# Patient Record
Sex: MALE | Race: WHITE | NOT HISPANIC OR LATINO | Employment: OTHER | URBAN - METROPOLITAN AREA
[De-identification: names, ages, dates, MRNs, and addresses within clinical notes are randomized per-mention and may not be internally consistent; named-entity substitution may affect disease eponyms.]

---

## 2023-09-02 PROBLEM — L57.0 ACTINIC KERATOSIS: Status: ACTIVE | Noted: 2021-11-15

## 2023-09-02 PROBLEM — I67.1 ANEURYSM OF ANTERIOR COMMUNICATING ARTERY (HHS-HCC): Status: ACTIVE | Noted: 2023-09-02

## 2023-09-02 PROBLEM — L60.3 NAIL DYSTROPHY: Status: ACTIVE | Noted: 2021-11-15

## 2023-09-02 PROBLEM — D48.5 NEOPLASM OF UNCERTAIN BEHAVIOR OF SKIN: Status: ACTIVE | Noted: 2021-11-15

## 2023-09-02 PROBLEM — H90.3 BILATERAL SENSORINEURAL HEARING LOSS: Status: ACTIVE | Noted: 2023-09-02

## 2023-09-02 PROBLEM — J34.89 LESION OR MASS OF PARANASAL SINUSES: Status: ACTIVE | Noted: 2023-09-02

## 2023-09-02 PROBLEM — R31.9 HEMATURIA: Status: ACTIVE | Noted: 2023-09-02

## 2023-09-02 PROBLEM — C44.41 BASAL CELL CARCINOMA OF SKIN OF SCALP AND NECK: Status: ACTIVE | Noted: 2021-11-15

## 2023-09-02 PROBLEM — C67.9 BLADDER CANCER (MULTI): Status: ACTIVE | Noted: 2023-09-02

## 2023-09-02 PROBLEM — L81.4 OTHER MELANIN HYPERPIGMENTATION: Status: ACTIVE | Noted: 2021-11-15

## 2023-09-02 PROBLEM — R30.0 DYSURIA: Status: ACTIVE | Noted: 2023-09-02

## 2023-09-02 RX ORDER — GLIMEPIRIDE 2 MG/1
2 TABLET ORAL
COMMUNITY
Start: 2015-02-02

## 2023-09-02 RX ORDER — EZETIMIBE 10 MG/1
1 TABLET ORAL DAILY
COMMUNITY
Start: 2014-10-24

## 2023-09-02 RX ORDER — OMEPRAZOLE 20 MG/1
20 CAPSULE, DELAYED RELEASE ORAL
COMMUNITY

## 2023-09-02 RX ORDER — ISOSORBIDE MONONITRATE 30 MG/1
1 TABLET, EXTENDED RELEASE ORAL DAILY
COMMUNITY

## 2023-09-02 RX ORDER — DILTIAZEM HYDROCHLORIDE EXTENDED-RELEASE TABLETS 240 MG/1
TABLET, EXTENDED RELEASE ORAL
COMMUNITY

## 2023-09-02 RX ORDER — ATORVASTATIN CALCIUM 80 MG/1
1 TABLET, FILM COATED ORAL NIGHTLY
COMMUNITY

## 2023-09-02 RX ORDER — METFORMIN HYDROCHLORIDE 500 MG/1
TABLET ORAL 2 TIMES DAILY
COMMUNITY
Start: 2015-11-02

## 2023-09-02 RX ORDER — HYDROCORTISONE 25 MG/G
CREAM TOPICAL
COMMUNITY
Start: 2020-12-15

## 2023-09-02 RX ORDER — BIMATOPROST 0.1 MG/ML
1 SOLUTION/ DROPS OPHTHALMIC NIGHTLY
COMMUNITY
Start: 2015-03-10

## 2023-09-02 RX ORDER — NITROGLYCERIN 0.4 MG/1
0.4 TABLET SUBLINGUAL
COMMUNITY
Start: 2016-10-26

## 2023-09-02 RX ORDER — LISINOPRIL 5 MG/1
1 TABLET ORAL DAILY
COMMUNITY
Start: 2015-02-23

## 2023-09-02 RX ORDER — CALCIUM CITRATE/VITAMIN D3 200MG-6.25
1 TABLET ORAL 3 TIMES DAILY
COMMUNITY
Start: 2016-03-15

## 2023-09-26 LAB
ALANINE AMINOTRANSFERASE (SGPT) (U/L) IN SER/PLAS: 13 U/L (ref 10–52)
ALBUMIN (G/DL) IN SER/PLAS: 4.1 G/DL (ref 3.4–5)
ALKALINE PHOSPHATASE (U/L) IN SER/PLAS: 64 U/L (ref 33–136)
ANION GAP IN SER/PLAS: 14 MMOL/L (ref 10–20)
ASPARTATE AMINOTRANSFERASE (SGOT) (U/L) IN SER/PLAS: 15 U/L (ref 9–39)
BILIRUBIN TOTAL (MG/DL) IN SER/PLAS: 0.5 MG/DL (ref 0–1.2)
CALCIUM (MG/DL) IN SER/PLAS: 9.6 MG/DL (ref 8.6–10.6)
CARBON DIOXIDE, TOTAL (MMOL/L) IN SER/PLAS: 28 MMOL/L (ref 21–32)
CHLORIDE (MMOL/L) IN SER/PLAS: 102 MMOL/L (ref 98–107)
CREATININE (MG/DL) IN SER/PLAS: 1.42 MG/DL (ref 0.5–1.3)
ERYTHROCYTE DISTRIBUTION WIDTH (RATIO) BY AUTOMATED COUNT: 13.5 % (ref 11.5–14.5)
ERYTHROCYTE MEAN CORPUSCULAR HEMOGLOBIN CONCENTRATION (G/DL) BY AUTOMATED: 31.9 G/DL (ref 32–36)
ERYTHROCYTE MEAN CORPUSCULAR VOLUME (FL) BY AUTOMATED COUNT: 102 FL (ref 80–100)
ERYTHROCYTES (10*6/UL) IN BLOOD BY AUTOMATED COUNT: 3.53 X10E12/L (ref 4.5–5.9)
GFR MALE: 48 ML/MIN/1.73M2
GLUCOSE (MG/DL) IN SER/PLAS: 137 MG/DL (ref 74–99)
HEMATOCRIT (%) IN BLOOD BY AUTOMATED COUNT: 36 % (ref 41–52)
HEMOGLOBIN (G/DL) IN BLOOD: 11.5 G/DL (ref 13.5–17.5)
LEUKOCYTES (10*3/UL) IN BLOOD BY AUTOMATED COUNT: 7.1 X10E9/L (ref 4.4–11.3)
NRBC (PER 100 WBCS) BY AUTOMATED COUNT: 0 /100 WBC (ref 0–0)
PLATELETS (10*3/UL) IN BLOOD AUTOMATED COUNT: 200 X10E9/L (ref 150–450)
POTASSIUM (MMOL/L) IN SER/PLAS: 4.6 MMOL/L (ref 3.5–5.3)
PROSTATE SPECIFIC AG (NG/ML) IN SER/PLAS: 1.33 NG/ML (ref 0–4)
PROTEIN TOTAL: 6.4 G/DL (ref 6.4–8.2)
SODIUM (MMOL/L) IN SER/PLAS: 139 MMOL/L (ref 136–145)
UREA NITROGEN (MG/DL) IN SER/PLAS: 21 MG/DL (ref 6–23)

## 2023-09-28 LAB
APPEARANCE, URINE: NORMAL
ASCORBIC ACID: NORMAL MG/DL
BILIRUBIN, URINE: NORMAL
BLOOD, URINE: NORMAL
COLOR, URINE: NORMAL
GLUCOSE, URINE: NORMAL
KETONES, URINE: NORMAL
LEUKOCYTE ESTERASE, URINE: NORMAL
NITRITE, URINE: NORMAL
PH, URINE: NORMAL
PROTEIN, URINE: NORMAL
SPECIFIC GRAVITY, URINE: NORMAL
UROBILINOGEN, URINE: NORMAL

## 2023-09-30 LAB — URINE CULTURE: ABNORMAL

## 2023-10-02 ENCOUNTER — TELEPHONE (OUTPATIENT)
Dept: UROLOGY | Facility: CLINIC | Age: 88
End: 2023-10-02
Payer: MEDICARE

## 2023-10-02 DIAGNOSIS — N30.00 ACUTE CYSTITIS WITHOUT HEMATURIA: Primary | ICD-10-CM

## 2023-10-02 RX ORDER — SULFAMETHOXAZOLE AND TRIMETHOPRIM 800; 160 MG/1; MG/1
1 TABLET ORAL 2 TIMES DAILY
Qty: 6 TABLET | Refills: 0 | Status: SHIPPED | OUTPATIENT
Start: 2023-10-02 | End: 2023-10-05

## 2023-10-10 ENCOUNTER — OFFICE VISIT (OUTPATIENT)
Dept: UROLOGY | Facility: CLINIC | Age: 88
End: 2023-10-10
Payer: MEDICARE

## 2023-10-10 VITALS
HEIGHT: 70 IN | SYSTOLIC BLOOD PRESSURE: 115 MMHG | BODY MASS INDEX: 23.19 KG/M2 | WEIGHT: 162 LBS | DIASTOLIC BLOOD PRESSURE: 69 MMHG | RESPIRATION RATE: 16 BRPM

## 2023-10-10 DIAGNOSIS — R31.21 ASYMPTOMATIC MICROSCOPIC HEMATURIA: ICD-10-CM

## 2023-10-10 DIAGNOSIS — C67.8 MALIGNANT NEOPLASM OF OVERLAPPING SITES OF BLADDER (MULTI): Primary | ICD-10-CM

## 2023-10-10 DIAGNOSIS — C67.8 MALIGNANT NEOPLASM OF OVERLAPPING SITES OF BLADDER (MULTI): ICD-10-CM

## 2023-10-10 PROCEDURE — 1159F MED LIST DOCD IN RCRD: CPT | Performed by: UROLOGY

## 2023-10-10 PROCEDURE — 1126F AMNT PAIN NOTED NONE PRSNT: CPT | Performed by: UROLOGY

## 2023-10-10 PROCEDURE — 99214 OFFICE O/P EST MOD 30 MIN: CPT | Performed by: UROLOGY

## 2023-10-10 PROCEDURE — 1036F TOBACCO NON-USER: CPT | Performed by: UROLOGY

## 2023-10-10 PROCEDURE — 1160F RVW MEDS BY RX/DR IN RCRD: CPT | Performed by: UROLOGY

## 2023-10-10 ASSESSMENT — ENCOUNTER SYMPTOMS
FREQUENCY: 0
DYSURIA: 0
HEMATURIA: 0
DIFFICULTY URINATING: 0

## 2023-10-10 ASSESSMENT — PAIN SCALES - GENERAL: PAINLEVEL: 0-NO PAIN

## 2023-10-10 NOTE — PATIENT INSTRUCTIONS
Patient Discussion/Summary     It was great to see you once again. All of your testing including a CAT scan was negative and no change. , Creatinine slightly increased to 1.42 however you state that your primary care physician is following this closely.  PSA is very low at 1.33.  CAT scan shows negative for metastatic disease. Your last cystoscopy 6 months ago was negative by report. Dr. Banks will repeat the cystoscopy in 6 months. I will see you again in 1 year.      This note was created with voice-recognition software and was not corrected for typographical or grammatical errors

## 2023-10-10 NOTE — LETTER
October 10, 2023     Jose Maria Zamudio MD  [address] [place] [address]    Patient: Leon J Weil, MD   YOB: 1935   Date of Visit: 10/10/2023       Dear Dr. Jose Maria Zamudio MD:    Thank you for referring Harvey Weil to me for evaluation. Below are my notes for this consultation.  If you have questions, please do not hesitate to call me. I look forward to following your patient along with you.       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 He received only one course of BCG at an outside institution under the direction of Dr. Banks. Negative family history of prostate or breast cancer. 40-pack-year cigarette smoking history.     Re-biopsy on 11/15/10 was also CIS, Patient transferred to my service and received to complete cycles of one third BCG and 50 million units of INTERFERON intravesically. Dr. Banks continue to provide service with TURBTs and biopsies which were all negative.     10/24/14 all testing is negative. The patient recently had a CYSTOSCOPY with Dr. Banks which was negative.      XARELTO      11/02/15, now 5 years status post his last BLADDER CANCER. Treated with BCG and interferon. All lab testing appears normal. There is a question of a LUNG NODULE and also of a COMPLEX RENAL CYSTS. The patient will obtain the old films and bring them to the [facility] for comparison. He does not want to pursue an MRI at this time. I will see him again in 1 year. By report, Dr. Banks performed a cystoscopy which was negative.     11/02/16, recent CYSTOSCOPY with Dr. Banks identified positive BLADDER CANCER, noninvasive  low-grade disease, TA low-grade BLADDER CANCER. All urine and blood studies are normal. CAT scan of the chest, abdomen and pelvis is negative. He will return in 1 year. However, should he have multiple recurrences during the year we will consider treatment with one third BCG for 6 week period.     03/03/17, OR, Dr. Banks, TURBT, LOW-GRADE PAPILLARY UROTHELIAL CELL CARCINOMA     10/20/17, patient has no new complaints. Creatinine 1.20. Hematocrit 39%. Urinalysis is negative. PSA 0.99. CT of the chest, abdomen and pelvis is negative for metastatic disease. Stable small pulmonary nodules no larger than 4 mm. Urine culture shows chronic staph. Cytology is negative. He will return in 1 year. He will obtain cystoscopies every 6 months from Dr. Dickerson.     07/02/18, OR, Dr. Banks, cystoscopy, raised lesion, 5 mm, lateral to the left ureteral orifice, fulgurated.     10/19/18, patient has no new complaints. CAT scan of the chest abdomen and pelvis is stable with several small pulmonary nodules unchanged. Urine cytology was negative. PSA 1.18. Creatinine has elevated to 1.40, previously 1.20 last year. The GFR is now down to 49. I am concerned that this may represent diabetic nephropathy and have recommended that he obtain a nephrologist. He wishes to do that on his own. Hematocrit 37%. He will return in 1 year for his routine follow-up. He is now 1 year and 7 months since his last positive bladder cancer.     12/17/18, patient calls complaining of symptoms of prostatitis and Cipro 500 mg by mouth every 12 hours for 14 days ordered.     11/01/19, patient arrives alone. He has no new complaints. No longer symptoms of prostatitis. His creatinine is followed by his primary care and if vacillates between 1.2 and 1.4. Urinalysis, urine culture and urine cytology are negative. PSA 1.03, hematocrit 36%, creatinine 1.21. CAT scan of the chest, abdomen and pelvis shows a stable 4 mm pulmonary nodule, no bladder wall thickening, no  evidence of metastatic disease within the abdomen or pelvis. He will have his next cystoscopy under local anesthesia with Dr. Banks in 6 months. He is now 2 years and 8 months since his last positive bladder cancer. I will see him again in 1 year.     03/03/20, cystoscopy, Dr. Marco Banks. 4 mm papillary tumor left back wall which was fulgurated. TA low grade disease, bladder cancer     09/29/20, OR, Dr. Marco Banks. TURBT of bladder neck. Papillary urothelial hyperplasia     10/23/20, patient arrives alone. He has no new urologic complaints. Creatinine 1.26. Urinalysis does show 166 red blood cells, however this is less than 1 week following his TURBT. Urine culture and urine cytology are negative. PSA was not performed. He will have that done locally. Hematocrit stable at 36%. CAT scan of the chest, abdomen and pelvis shows a stable meter right lower lobe lung nodule. A stable 5 mm left hepatic dome nodule. And a stable 8 mm left adrenal gland adenoma. No evidence of metastatic disease within the abdomen and pelvis. He will continue to see Dr. Banks and have cystoscopies every 6 months. He is now 7 months status post his last bladder cancer. TA low grade disease. I will see him again in 1 year.     October 12, 2021, patient arrives alone. He has no new urologic complaints. Recently underwent Mohs procedure on his scalp and also a removal of a maxillary cyst. Creatinine has risen to 1.43, however he states that his primary care physician, Dr. Zamudio is watching this closely. Urinalysis, urine culture and urine cytology were not performed. Renal colic CAT scan reveals no evidence of metastatic disease, stable adrenal adenoma and stable hepatic lobe liver lesion. In addition, CAT scan of the chest once again identifies no metastatic disease and a stable 7 mm pulmonary nodule which is deemed benign. No need for follow-up at this time. Patient's last cystoscopy with Dr. Gibbons last week was reported is negative. He is  now 13 months since his last positive bladder cancer, TA low-grade disease, no therapy. PSA 1.18. Hematocrit 37%. He will return in 1 year.     March 16, 2022, cardiac catheterization     June 7, 2022, Dr. Banks, cystoscopy with fulguration of 3 mm bladder tumor     October 11, 2022, patient returns and he has no new complaints. Creatinine stable at 1.39 and is followed by his primary care physician Dr. Zamudio. Urinalysis and urine cytology are negative. 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 He would except a Bactrim prescription. Renal colic CAT scan once again reveals no evidence of metastatic disease. Now 2 years since his last positive bladder cancer, TA level stage II disease, no therapy. PSA 1.21. Hematocrit 36%. He will return in 1 year.    March 1, 2023, office visit with his urologist Dr. Marco Gibbons.  Cystoscopy performed no tumors identified.    October 10, 2023, patient returns alone.  He has no new urologic complaints.  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  He states that his urine cultures have been positive for staph for the past 11 years.  Hematocrit is identical at 36%.  CAT scan of the abdomen and pelvis does not show any recurrent or metastatic disease.  Creatinine is slightly increased to 1.42 and is followed by his internist.  PSA is normal at 1.33.  Now 3 years since his last positive bladder cancer, stage TA level stage II disease, no therapy.  Originally identified with CIS and treated with BCG and interferon.  He will return in 1 year.     PLAN:     #1 patient will repeat in 6 months his cystoscopy with Dr. Banks.     #2 he now has a 30% chance of progression over the next 2 years.     #3 he will return in October of 2024 with urine tests, blood tests, PSA, renal colic CAT scan .    Physical Exam  Vitals reviewed.   HENT:      Head: Normocephalic.   Pulmonary:      Effort:  Pulmonary effort is normal.   Musculoskeletal:         General: Normal range of motion.      Cervical back: Normal range of motion.   Neurological:      General: No focal deficit present.      Mental Status: He is alert and oriented to person, place, and time.   Psychiatric:         Mood and Affect: Mood normal.         This note was created with voice-recognition software and was not corrected for typographical or grammatical errors

## 2023-10-10 NOTE — PROGRESS NOTES
Patient denies any pain today. Patient had MOHS on left cheek 7/2023. Patient denies any concerns about falling or safety. Patient has no urinary issues.  lab in [place] did not complete urine cytology.       Review of Systems   Genitourinary:  Negative for difficulty urinating, dysuria, frequency, hematuria and urgency.

## 2023-10-10 NOTE — PROGRESS NOTES
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 He received only one course of BCG at an outside institution under the direction of Dr. Banks. Negative family history of prostate or breast cancer. 40-pack-year cigarette smoking history.     Re-biopsy on 11/15/10 was also CIS, Patient transferred to my service and received to complete cycles of one third BCG and 50 million units of INTERFERON intravesically. Dr. Banks continue to provide service with TURBTs and biopsies which were all negative.     10/24/14 all testing is negative. The patient recently had a CYSTOSCOPY with Dr. Banks which was negative.      XARELTO      11/02/15, now 5 years status post his last BLADDER CANCER. Treated with BCG and interferon. All lab testing appears normal. There is a question of a LUNG NODULE and also of a COMPLEX RENAL CYSTS. The patient will obtain the old films and bring them to the new Center for comparison. He does not want to pursue an MRI at this time. I will see him again in 1 year. By report, Dr. Banks performed a cystoscopy which was negative.     11/02/16, recent CYSTOSCOPY with Dr. Banks identified positive BLADDER CANCER, noninvasive low-grade disease, TA low-grade BLADDER CANCER. All urine and blood studies are normal. CAT scan of the chest, abdomen and pelvis is negative. He will return in 1 year. However, should he have multiple recurrences during the year we will consider treatment with one third BCG for 6 week period.     03/03/17, OR, Dr. Banks, TURBT, LOW-GRADE PAPILLARY UROTHELIAL CELL CARCINOMA     10/20/17, patient has no new complaints. Creatinine 1.20. Hematocrit 39%. Urinalysis is negative. PSA 0.99. CT of the chest, abdomen and pelvis is negative for metastatic disease. Stable small pulmonary nodules no  larger than 4 mm. Urine culture shows chronic staph. Cytology is negative. He will return in 1 year. He will obtain cystoscopies every 6 months from Dr. Dickerson.     07/02/18, OR, Dr. Banks, cystoscopy, raised lesion, 5 mm, lateral to the left ureteral orifice, fulgurated.     10/19/18, patient has no new complaints. CAT scan of the chest abdomen and pelvis is stable with several small pulmonary nodules unchanged. Urine cytology was negative. PSA 1.18. Creatinine has elevated to 1.40, previously 1.20 last year. The GFR is now down to 49. I am concerned that this may represent diabetic nephropathy and have recommended that he obtain a nephrologist. He wishes to do that on his own. Hematocrit 37%. He will return in 1 year for his routine follow-up. He is now 1 year and 7 months since his last positive bladder cancer.     12/17/18, patient calls complaining of symptoms of prostatitis and Cipro 500 mg by mouth every 12 hours for 14 days ordered.     11/01/19, patient arrives alone. He has no new complaints. No longer symptoms of prostatitis. His creatinine is followed by his primary care and if vacillates between 1.2 and 1.4. Urinalysis, urine culture and urine cytology are negative. PSA 1.03, hematocrit 36%, creatinine 1.21. CAT scan of the chest, abdomen and pelvis shows a stable 4 mm pulmonary nodule, no bladder wall thickening, no evidence of metastatic disease within the abdomen or pelvis. He will have his next cystoscopy under local anesthesia with Dr. Banks in 6 months. He is now 2 years and 8 months since his last positive bladder cancer. I will see him again in 1 year.     03/03/20, cystoscopy, Dr. Marco Banks. 4 mm papillary tumor left back wall which was fulgurated. TA low grade disease, bladder cancer     09/29/20, OR, Dr. Marco Banks. TURBT of bladder neck. Papillary urothelial hyperplasia     10/23/20, patient arrives alone. He has no new urologic complaints. Creatinine 1.26. Urinalysis does show 166 red  blood cells, however this is less than 1 week following his TURBT. Urine culture and urine cytology are negative. PSA was not performed. He will have that done locally. Hematocrit stable at 36%. CAT scan of the chest, abdomen and pelvis shows a stable meter right lower lobe lung nodule. A stable 5 mm left hepatic dome nodule. And a stable 8 mm left adrenal gland adenoma. No evidence of metastatic disease within the abdomen and pelvis. He will continue to see Dr. Banks and have cystoscopies every 6 months. He is now 7 months status post his last bladder cancer. TA low grade disease. I will see him again in 1 year.     October 12, 2021, patient arrives alone. He has no new urologic complaints. Recently underwent Mohs procedure on his scalp and also a removal of a maxillary cyst. Creatinine has risen to 1.43, however he states that his primary care physician, Dr. Zamudio is watching this closely. Urinalysis, urine culture and urine cytology were not performed. Renal colic CAT scan reveals no evidence of metastatic disease, stable adrenal adenoma and stable hepatic lobe liver lesion. In addition, CAT scan of the chest once again identifies no metastatic disease and a stable 7 mm pulmonary nodule which is deemed benign. No need for follow-up at this time. Patient's last cystoscopy with Dr. Gibbons last week was reported is negative. He is now 13 months since his last positive bladder cancer, TA low-grade disease, no therapy. PSA 1.18. Hematocrit 37%. He will return in 1 year.     March 16, 2022, cardiac catheterization     June 7, 2022, Dr. Banks, cystoscopy with fulguration of 3 mm bladder tumor     October 11, 2022, patient returns and he has no new complaints. Creatinine stable at 1.39 and is followed by his primary care physician Dr. Zamudio. Urinalysis and urine cytology are negative. 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 He would except a  Bactrim prescription. Renal colic CAT scan once again reveals no evidence of metastatic disease. Now 2 years since his last positive bladder cancer, TA level stage II disease, no therapy. PSA 1.21. Hematocrit 36%. He will return in 1 year.    March 1, 2023, office visit with his urologist Dr. Marco Gibbons.  Cystoscopy performed no tumors identified.    October 10, 2023, patient returns alone.  He has no new urologic complaints.  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  He states that his urine cultures have been positive for staph for the past 11 years.  Hematocrit is identical at 36%.  CAT scan of the abdomen and pelvis does not show any recurrent or metastatic disease.  Creatinine is slightly increased to 1.42 and is followed by his internist.  PSA is normal at 1.33.  Now 3 years since his last positive bladder cancer, stage TA level stage II disease, no therapy.  Originally identified with CIS and treated with BCG and interferon.  He will return in 1 year.     PLAN:     #1 patient will repeat in 6 months his cystoscopy with Dr. Banks.     #2 he now has a 30% chance of progression over the next 2 years.     #3 he will return in October of 2024 with urine tests, blood tests, PSA, renal colic CAT scan .    Physical Exam  Vitals reviewed.   HENT:      Head: Normocephalic.   Pulmonary:      Effort: Pulmonary effort is normal.   Musculoskeletal:         General: Normal range of motion.      Cervical back: Normal range of motion.   Neurological:      General: No focal deficit present.      Mental Status: He is alert and oriented to person, place, and time.   Psychiatric:         Mood and Affect: Mood normal.         This note was created with voice-recognition software and was not corrected for typographical or grammatical errors

## 2024-02-08 ENCOUNTER — HOSPITAL ENCOUNTER (OUTPATIENT)
Dept: RADIOLOGY | Facility: CLINIC | Age: 89
Discharge: HOME | End: 2024-02-08
Payer: MEDICARE

## 2024-02-08 ENCOUNTER — LAB (OUTPATIENT)
Dept: LAB | Facility: LAB | Age: 89
End: 2024-02-08
Payer: MEDICARE

## 2024-02-08 ENCOUNTER — APPOINTMENT (OUTPATIENT)
Dept: RADIOLOGY | Facility: CLINIC | Age: 89
End: 2024-02-08
Payer: MEDICARE

## 2024-02-08 DIAGNOSIS — R31.21 ASYMPTOMATIC MICROSCOPIC HEMATURIA: ICD-10-CM

## 2024-02-08 DIAGNOSIS — C67.8 MALIGNANT NEOPLASM OF OVERLAPPING SITES OF BLADDER (MULTI): ICD-10-CM

## 2024-02-08 PROCEDURE — 74176 CT ABD & PELVIS W/O CONTRAST: CPT

## 2024-02-08 PROCEDURE — 84153 ASSAY OF PSA TOTAL: CPT

## 2024-02-08 PROCEDURE — 36415 COLL VENOUS BLD VENIPUNCTURE: CPT

## 2024-02-08 PROCEDURE — 85027 COMPLETE CBC AUTOMATED: CPT

## 2024-02-08 PROCEDURE — 80048 BASIC METABOLIC PNL TOTAL CA: CPT

## 2024-02-08 PROCEDURE — 74176 CT ABD & PELVIS W/O CONTRAST: CPT | Performed by: RADIOLOGY

## 2024-02-09 LAB
ANION GAP SERPL CALC-SCNC: 17 MMOL/L (ref 10–20)
BUN SERPL-MCNC: 15 MG/DL (ref 6–23)
CALCIUM SERPL-MCNC: 9.5 MG/DL (ref 8.6–10.6)
CHLORIDE SERPL-SCNC: 103 MMOL/L (ref 98–107)
CO2 SERPL-SCNC: 25 MMOL/L (ref 21–32)
CREAT SERPL-MCNC: 1.4 MG/DL (ref 0.5–1.3)
EGFRCR SERPLBLD CKD-EPI 2021: 49 ML/MIN/1.73M*2
ERYTHROCYTE [DISTWIDTH] IN BLOOD BY AUTOMATED COUNT: 13.8 % (ref 11.5–14.5)
GLUCOSE SERPL-MCNC: 175 MG/DL (ref 74–99)
HCT VFR BLD AUTO: 37.9 % (ref 41–52)
HGB BLD-MCNC: 12.4 G/DL (ref 13.5–17.5)
MCH RBC QN AUTO: 32.8 PG (ref 26–34)
MCHC RBC AUTO-ENTMCNC: 32.7 G/DL (ref 32–36)
MCV RBC AUTO: 100 FL (ref 80–100)
NRBC BLD-RTO: 0 /100 WBCS (ref 0–0)
PLATELET # BLD AUTO: 197 X10*3/UL (ref 150–450)
POTASSIUM SERPL-SCNC: 5.3 MMOL/L (ref 3.5–5.3)
PSA SERPL-MCNC: 1.05 NG/ML
RBC # BLD AUTO: 3.78 X10*6/UL (ref 4.5–5.9)
SODIUM SERPL-SCNC: 140 MMOL/L (ref 136–145)
WBC # BLD AUTO: 7.6 X10*3/UL (ref 4.4–11.3)

## 2024-10-14 ENCOUNTER — LAB (OUTPATIENT)
Dept: LAB | Facility: LAB | Age: 89
End: 2024-10-14
Payer: COMMERCIAL

## 2024-10-14 LAB
APPEARANCE UR: ABNORMAL
BILIRUB UR STRIP.AUTO-MCNC: NEGATIVE MG/DL
COLOR UR: ABNORMAL
GLUCOSE UR STRIP.AUTO-MCNC: ABNORMAL MG/DL
KETONES UR STRIP.AUTO-MCNC: NEGATIVE MG/DL
LEUKOCYTE ESTERASE UR QL STRIP.AUTO: ABNORMAL
MUCOUS THREADS #/AREA URNS AUTO: ABNORMAL /LPF
NITRITE UR QL STRIP.AUTO: ABNORMAL
PH UR STRIP.AUTO: 5.5 [PH]
PROT UR STRIP.AUTO-MCNC: NEGATIVE MG/DL
RBC # UR STRIP.AUTO: NEGATIVE /UL
RBC #/AREA URNS AUTO: ABNORMAL /HPF
SP GR UR STRIP.AUTO: 1.01
UROBILINOGEN UR STRIP.AUTO-MCNC: NORMAL MG/DL
WBC #/AREA URNS AUTO: ABNORMAL /HPF

## 2024-10-14 PROCEDURE — 87077 CULTURE AEROBIC IDENTIFY: CPT

## 2024-10-14 PROCEDURE — 87086 URINE CULTURE/COLONY COUNT: CPT

## 2024-10-14 PROCEDURE — 81001 URINALYSIS AUTO W/SCOPE: CPT

## 2024-10-16 ENCOUNTER — APPOINTMENT (OUTPATIENT)
Dept: UROLOGY | Facility: CLINIC | Age: 89
End: 2024-10-16
Payer: MEDICARE

## 2024-10-16 LAB — BACTERIA UR CULT: NORMAL

## 2024-10-19 ENCOUNTER — APPOINTMENT (OUTPATIENT)
Dept: UROLOGY | Facility: CLINIC | Age: 89
End: 2024-10-19
Payer: COMMERCIAL

## 2024-10-19 ENCOUNTER — OFFICE VISIT (OUTPATIENT)
Dept: UROLOGY | Facility: CLINIC | Age: 89
End: 2024-10-19
Payer: COMMERCIAL

## 2024-10-19 VITALS
HEIGHT: 70 IN | DIASTOLIC BLOOD PRESSURE: 72 MMHG | HEART RATE: 82 BPM | BODY MASS INDEX: 23.2 KG/M2 | SYSTOLIC BLOOD PRESSURE: 152 MMHG | WEIGHT: 162.04 LBS | RESPIRATION RATE: 18 BRPM

## 2024-10-19 DIAGNOSIS — R31.9 HEMATURIA, UNSPECIFIED TYPE: ICD-10-CM

## 2024-10-19 DIAGNOSIS — C67.8 MALIGNANT NEOPLASM OF OVERLAPPING SITES OF BLADDER (MULTI): Primary | ICD-10-CM

## 2024-10-19 DIAGNOSIS — C67.8 MALIGNANT NEOPLASM OF OVERLAPPING SITES OF BLADDER (MULTI): ICD-10-CM

## 2024-10-19 PROCEDURE — G2211 COMPLEX E/M VISIT ADD ON: HCPCS | Performed by: UROLOGY

## 2024-10-19 PROCEDURE — 1159F MED LIST DOCD IN RCRD: CPT | Performed by: UROLOGY

## 2024-10-19 PROCEDURE — 99214 OFFICE O/P EST MOD 30 MIN: CPT | Performed by: UROLOGY

## 2024-10-19 PROCEDURE — 1036F TOBACCO NON-USER: CPT | Performed by: UROLOGY

## 2024-10-19 PROCEDURE — 1160F RVW MEDS BY RX/DR IN RCRD: CPT | Performed by: UROLOGY

## 2024-10-19 NOTE — PROGRESS NOTES
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 He received only one course of BCG at an outside institution under the direction of Dr. Banks. Negative family history of prostate or breast cancer. 40-pack-year cigarette smoking history.     Re-biopsy on 11/15/10 was also CIS, Patient transferred to my service and received to complete cycles of one third BCG and 50 million units of INTERFERON intravesically. Dr. Banks continue to provide service with TURBTs and biopsies which were all negative.     10/24/14 all testing is negative. The patient recently had a CYSTOSCOPY with Dr. Banks which was negative.      XARELTO      11/02/15, now 5 years status post his last BLADDER CANCER. Treated with BCG and interferon. All lab testing appears normal. There is a question of a LUNG NODULE and also of a COMPLEX RENAL CYSTS. The patient will obtain the old films and bring them to the new Center for comparison. He does not want to pursue an MRI at this time. I will see him again in 1 year. By report, Dr. Banks performed a cystoscopy which was negative.     11/02/16, recent CYSTOSCOPY with Dr. Banks identified positive BLADDER CANCER, noninvasive low-grade disease, TA low-grade BLADDER CANCER. All urine and blood studies are normal. CAT scan of the chest, abdomen and pelvis is negative. He will return in 1 year. However, should he have multiple recurrences during the year we will consider treatment with one third BCG for 6 week period.     03/03/17, OR, Dr. Banks, TURBT, LOW-GRADE PAPILLARY UROTHELIAL CELL CARCINOMA     10/20/17, patient has no new complaints. Creatinine 1.20. Hematocrit 39%. Urinalysis is negative. PSA 0.99. CT of the chest, abdomen and pelvis is negative for metastatic disease. Stable small pulmonary nodules no  larger than 4 mm. Urine culture shows chronic staph. Cytology is negative. He will return in 1 year. He will obtain cystoscopies every 6 months from Dr. Dickerson.     07/02/18, OR, Dr. Banks, cystoscopy, raised lesion, 5 mm, lateral to the left ureteral orifice, fulgurated.     10/19/18, patient has no new complaints. CAT scan of the chest abdomen and pelvis is stable with several small pulmonary nodules unchanged. Urine cytology was negative. PSA 1.18. Creatinine has elevated to 1.40, previously 1.20 last year. The GFR is now down to 49. I am concerned that this may represent diabetic nephropathy and have recommended that he obtain a nephrologist. He wishes to do that on his own. Hematocrit 37%. He will return in 1 year for his routine follow-up. He is now 1 year and 7 months since his last positive bladder cancer.     12/17/18, patient calls complaining of symptoms of prostatitis and Cipro 500 mg by mouth every 12 hours for 14 days ordered.     11/01/19, patient arrives alone. He has no new complaints. No longer symptoms of prostatitis. His creatinine is followed by his primary care and if vacillates between 1.2 and 1.4. Urinalysis, urine culture and urine cytology are negative. PSA 1.03, hematocrit 36%, creatinine 1.21. CAT scan of the chest, abdomen and pelvis shows a stable 4 mm pulmonary nodule, no bladder wall thickening, no evidence of metastatic disease within the abdomen or pelvis. He will have his next cystoscopy under local anesthesia with Dr. Banks in 6 months. He is now 2 years and 8 months since his last positive bladder cancer. I will see him again in 1 year.     03/03/20, cystoscopy, Dr. Marco Banks. 4 mm papillary tumor left back wall which was fulgurated. TA low grade disease, bladder cancer     09/29/20, OR, Dr. Marco Banks. TURBT of bladder neck. Papillary urothelial hyperplasia     10/23/20, patient arrives alone. He has no new urologic complaints. Creatinine 1.26. Urinalysis does show 166 red  blood cells, however this is less than 1 week following his TURBT. Urine culture and urine cytology are negative. PSA was not performed. He will have that done locally. Hematocrit stable at 36%. CAT scan of the chest, abdomen and pelvis shows a stable meter right lower lobe lung nodule. A stable 5 mm left hepatic dome nodule. And a stable 8 mm left adrenal gland adenoma. No evidence of metastatic disease within the abdomen and pelvis. He will continue to see Dr. Banks and have cystoscopies every 6 months. He is now 7 months status post his last bladder cancer. TA low grade disease. I will see him again in 1 year.     October 12, 2021, patient arrives alone. He has no new urologic complaints. Recently underwent Mohs procedure on his scalp and also a removal of a maxillary cyst. Creatinine has risen to 1.43, however he states that his primary care physician, Dr. Zamudio is watching this closely. Urinalysis, urine culture and urine cytology were not performed. Renal colic CAT scan reveals no evidence of metastatic disease, stable adrenal adenoma and stable hepatic lobe liver lesion. In addition, CAT scan of the chest once again identifies no metastatic disease and a stable 7 mm pulmonary nodule which is deemed benign. No need for follow-up at this time. Patient's last cystoscopy with Dr. Gibbons last week was reported is negative. He is now 13 months since his last positive bladder cancer, TA low-grade disease, no therapy. PSA 1.18. Hematocrit 37%. He will return in 1 year.     March 16, 2022, cardiac catheterization     June 7, 2022, Dr. Banks, cystoscopy with fulguration of 3 mm bladder tumor     October 11, 2022, patient returns and he has no new complaints. Creatinine stable at 1.39 and is followed by his primary care physician Dr. Zamudio. Urinalysis and urine cytology are negative. 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 He would except a  Bactrim prescription. Renal colic CAT scan once again reveals no evidence of metastatic disease. Now 2 years since his last positive bladder cancer, TA level stage II disease, no therapy. PSA 1.21. Hematocrit 36%. He will return in 1 year.     March 1, 2023, office visit with his urologist Dr. Marco Gibbons.  Cystoscopy performed no tumors identified.     October 10, 2023, patient returns alone.  He has no new urologic complaints.  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  He states that his urine cultures have been positive for staph for the past 11 years.  Hematocrit is identical at 36%.  CAT scan of the abdomen and pelvis does not show any recurrent or metastatic disease.  Creatinine is slightly increased to 1.42 and is followed by his internist.  PSA is normal at 1.33.  Now 3 years since his last positive bladder cancer, stage TA level stage II disease, no therapy.  Originally identified with CIS and treated with BCG and interferon.  He will return in 1 year.    October 19, 2024, patient arrives alone.  He has no new urologic complaints.  His urologist in [place], Dr. Gibbons recently performed a cystoscopy and fulguration of a 5 mm bladder lesion.  However, Dr. Gibbons also identified a lesion at the left ureteral orifice and I will the patient is scheduled on November 14 to undergo a TURBT and ureteroscopy with tumor biopsy under anesthesia.  Urinalysis is negative for blood.  Urine culture shows no growth.  CAT scan of the abdomen and pelvis does not identify any metastatic disease.  Creatinine 1.24.  Recent renal ultrasound was also negative.  PSA is normal at 1.05.  He will keep us apprised of his pathology and operative report following his surgery with Dr. Gibbons.  Otherwise he will return next October.     PLAN:     #1 patient will undergo TURBT under anesthesia on November 14, 2024 with Dr. Banks.     #2      #3 he will return in October of 2025 with urine tests, blood tests,  PSA, renal colic CAT scan .     Physical Exam  Vitals reviewed.   HENT:      Head: Normocephalic.   Pulmonary:      Effort: Pulmonary effort is normal.   Musculoskeletal:         General: Normal range of motion.      Cervical back: Normal range of motion.   Neurological:      General: No focal deficit present.      Mental Status: He is alert and oriented to person, place, and time.   Psychiatric:         Mood and Affect: Mood normal.          This note was created with voice-recognition software and was not corrected for typographical or grammatical errors

## 2024-10-19 NOTE — PATIENT INSTRUCTIONS
Patient Discussion/Summary     It was great to see you once again. All of your testing including a CAT scan was negative and no change. , Creatinine is stable at 1.24.  PSA is very low at 1.09.  CAT scan shows negative for metastatic disease.  You are scheduled for removal of a bladder tumor near your left ureteral orifice with Dr. Banks in November 14, 2024 .  Will send us a copy of the operative report and pathology report.  I will see you again in 1 year.      This note was created with voice-recognition software and was not corrected for typographical or grammatical errors

## 2024-10-19 NOTE — LETTER
October 19, 2024     Venkata Carrera MD  [address]  [place] [address]    Patient: Leon LOLI Weil, MD   YOB: 1935   Date of Visit: 10/19/2024       Dear Dr. Venkata Carrera MD:    Thank you for referring Harvey Weil to me for evaluation. Below are my notes for this consultation.  If you have questions, please do not hesitate to call me. I look forward to following your patient along with you.       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 He received only one course of BCG at an outside institution under the direction of Dr. Banks. Negative family history of prostate or breast cancer. 40-pack-year cigarette smoking history.     Re-biopsy on 11/15/10 was also CIS, Patient transferred to my service and received to complete cycles of one third BCG and 50 million units of INTERFERON intravesically. Dr. Banks continue to provide service with TURBTs and biopsies which were all negative.     10/24/14 all testing is negative. The patient recently had a CYSTOSCOPY with Dr. Banks which was negative.      XARELTO      11/02/15, now 5 years status post his last BLADDER CANCER. Treated with BCG and interferon. All lab testing appears normal. There is a question of a LUNG NODULE and also of a COMPLEX RENAL CYSTS. The patient will obtain the old films and bring them to the new Center for comparison. He does not want to pursue an MRI at this time. I will see him again in 1 year. By report, Dr. Banks performed a cystoscopy which was negative.     11/02/16, recent CYSTOSCOPY with Dr. Banks identified positive BLADDER CANCER, noninvasive low-grade disease, TA  low-grade BLADDER CANCER. All urine and blood studies are normal. CAT scan of the chest, abdomen and pelvis is negative. He will return in 1 year. However, should he have multiple recurrences during the year we will consider treatment with one third BCG for 6 week period.     03/03/17, OR, Dr. Banks, TURBT, LOW-GRADE PAPILLARY UROTHELIAL CELL CARCINOMA     10/20/17, patient has no new complaints. Creatinine 1.20. Hematocrit 39%. Urinalysis is negative. PSA 0.99. CT of the chest, abdomen and pelvis is negative for metastatic disease. Stable small pulmonary nodules no larger than 4 mm. Urine culture shows chronic staph. Cytology is negative. He will return in 1 year. He will obtain cystoscopies every 6 months from Dr. Dickerson.     07/02/18, OR, Dr. Banks, cystoscopy, raised lesion, 5 mm, lateral to the left ureteral orifice, fulgurated.     10/19/18, patient has no new complaints. CAT scan of the chest abdomen and pelvis is stable with several small pulmonary nodules unchanged. Urine cytology was negative. PSA 1.18. Creatinine has elevated to 1.40, previously 1.20 last year. The GFR is now down to 49. I am concerned that this may represent diabetic nephropathy and have recommended that he obtain a nephrologist. He wishes to do that on his own. Hematocrit 37%. He will return in 1 year for his routine follow-up. He is now 1 year and 7 months since his last positive bladder cancer.     12/17/18, patient calls complaining of symptoms of prostatitis and Cipro 500 mg by mouth every 12 hours for 14 days ordered.     11/01/19, patient arrives alone. He has no new complaints. No longer symptoms of prostatitis. His creatinine is followed by his primary care and if vacillates between 1.2 and 1.4. Urinalysis, urine culture and urine cytology are negative. PSA 1.03, hematocrit 36%, creatinine 1.21. CAT scan of the chest, abdomen and pelvis shows a stable 4 mm pulmonary nodule, no bladder wall thickening, no evidence of metastatic  disease within the abdomen or pelvis. He will have his next cystoscopy under local anesthesia with Dr. Banks in 6 months. He is now 2 years and 8 months since his last positive bladder cancer. I will see him again in 1 year.     03/03/20, cystoscopy, Dr. Marco Banks. 4 mm papillary tumor left back wall which was fulgurated. TA low grade disease, bladder cancer     09/29/20, OR, Dr. Marco Banks. TURBT of bladder neck. Papillary urothelial hyperplasia     10/23/20, patient arrives alone. He has no new urologic complaints. Creatinine 1.26. Urinalysis does show 166 red blood cells, however this is less than 1 week following his TURBT. Urine culture and urine cytology are negative. PSA was not performed. He will have that done locally. Hematocrit stable at 36%. CAT scan of the chest, abdomen and pelvis shows a stable meter right lower lobe lung nodule. A stable 5 mm left hepatic dome nodule. And a stable 8 mm left adrenal gland adenoma. No evidence of metastatic disease within the abdomen and pelvis. He will continue to see Dr. Banks and have cystoscopies every 6 months. He is now 7 months status post his last bladder cancer. TA low grade disease. I will see him again in 1 year.     October 12, 2021, patient arrives alone. He has no new urologic complaints. Recently underwent Mohs procedure on his scalp and also a removal of a maxillary cyst. Creatinine has risen to 1.43, however he states that his primary care physician, Dr. Zamudio is watching this closely. Urinalysis, urine culture and urine cytology were not performed. Renal colic CAT scan reveals no evidence of metastatic disease, stable adrenal adenoma and stable hepatic lobe liver lesion. In addition, CAT scan of the chest once again identifies no metastatic disease and a stable 7 mm pulmonary nodule which is deemed benign. No need for follow-up at this time. Patient's last cystoscopy with Dr. Gibbons last week was reported is negative. He is now 13 months since his  last positive bladder cancer, TA low-grade disease, no therapy. PSA 1.18. Hematocrit 37%. He will return in 1 year.     March 16, 2022, cardiac catheterization     June 7, 2022, Dr. Banks, cystoscopy with fulguration of 3 mm bladder tumor     October 11, 2022, patient returns and he has no new complaints. Creatinine stable at 1.39 and is followed by his primary care physician Dr. Zamudio. Urinalysis and urine cytology are negative. 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 He would except a Bactrim prescription. Renal colic CAT scan once again reveals no evidence of metastatic disease. Now 2 years since his last positive bladder cancer, TA level stage II disease, no therapy. PSA 1.21. Hematocrit 36%. He will return in 1 year.     March 1, 2023, office visit with his urologist Dr. Marco Gibbons.  Cystoscopy performed no tumors identified.     October 10, 2023, patient returns alone.  He has no new urologic complaints.  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  He states that his urine cultures have been positive for staph for the past 11 years.  Hematocrit is identical at 36%.  CAT scan of the abdomen and pelvis does not show any recurrent or metastatic disease.  Creatinine is slightly increased to 1.42 and is followed by his internist.  PSA is normal at 1.33.  Now 3 years since his last positive bladder cancer, stage TA level stage II disease, no therapy.  Originally identified with CIS and treated with BCG and interferon.  He will return in 1 year.    October 19, 2024, patient arrives alone.  He has no new urologic complaints.  His urologist in [place], Dr. Gibbons recently performed a cystoscopy and fulguration of a 5 mm bladder lesion.  However, Dr. Gibbons also identified a lesion at the left ureteral orifice and I will the patient is scheduled on November 14 to undergo a TURBT and ureteroscopy with tumor  biopsy under anesthesia.  Urinalysis is negative for blood.  Urine culture shows no growth.  CAT scan of the abdomen and pelvis does not identify any metastatic disease.  Creatinine 1.24.  Recent renal ultrasound was also negative.  PSA is normal at 1.05.  He will keep us apprised of his pathology and operative report following his surgery with Dr. Gibbons.  Otherwise he will return next October.     PLAN:     #1 patient will undergo TURBT under anesthesia on November 14, 2024 with Dr. Banks.     #2      #3 he will return in October of 2025 with urine tests, blood tests, PSA, renal colic CAT scan .     Physical Exam  Vitals reviewed.   HENT:      Head: Normocephalic.   Pulmonary:      Effort: Pulmonary effort is normal.   Musculoskeletal:         General: Normal range of motion.      Cervical back: Normal range of motion.   Neurological:      General: No focal deficit present.      Mental Status: He is alert and oriented to person, place, and time.   Psychiatric:         Mood and Affect: Mood normal.          This note was created with voice-recognition software and was not corrected for typographical or grammatical errors

## 2025-03-04 ENCOUNTER — APPOINTMENT (OUTPATIENT)
Dept: OTOLARYNGOLOGY | Facility: CLINIC | Age: 89
End: 2025-03-04
Payer: MEDICARE

## 2025-10-20 ENCOUNTER — APPOINTMENT (OUTPATIENT)
Dept: UROLOGY | Facility: CLINIC | Age: 89
End: 2025-10-20
Payer: COMMERCIAL